# Patient Record
Sex: MALE | Race: ASIAN | NOT HISPANIC OR LATINO | URBAN - METROPOLITAN AREA
[De-identification: names, ages, dates, MRNs, and addresses within clinical notes are randomized per-mention and may not be internally consistent; named-entity substitution may affect disease eponyms.]

---

## 2024-03-18 ENCOUNTER — EMERGENCY (EMERGENCY)
Facility: HOSPITAL | Age: 59
LOS: 1 days | Discharge: HOME | End: 2024-03-18
Attending: STUDENT IN AN ORGANIZED HEALTH CARE EDUCATION/TRAINING PROGRAM | Admitting: EMERGENCY MEDICINE
Payer: COMMERCIAL

## 2024-03-18 VITALS
HEART RATE: 95 BPM | RESPIRATION RATE: 18 BRPM | DIASTOLIC BLOOD PRESSURE: 84 MMHG | SYSTOLIC BLOOD PRESSURE: 133 MMHG | OXYGEN SATURATION: 100 % | TEMPERATURE: 98 F

## 2024-03-18 PROCEDURE — 99285 EMERGENCY DEPT VISIT HI MDM: CPT

## 2024-03-18 RX ORDER — KETOROLAC TROMETHAMINE 30 MG/ML
15 SYRINGE (ML) INJECTION ONCE
Refills: 0 | Status: DISCONTINUED | OUTPATIENT
Start: 2024-03-18 | End: 2024-03-18

## 2024-03-18 RX ORDER — ACETAMINOPHEN 500 MG
1000 TABLET ORAL ONCE
Refills: 0 | Status: COMPLETED | OUTPATIENT
Start: 2024-03-18 | End: 2024-03-18

## 2024-03-18 RX ORDER — SODIUM CHLORIDE 9 MG/ML
1000 INJECTION, SOLUTION INTRAVENOUS ONCE
Refills: 0 | Status: COMPLETED | OUTPATIENT
Start: 2024-03-18 | End: 2024-03-18

## 2024-03-18 NOTE — ED ADULT TRIAGE NOTE - CHIEF COMPLAINT QUOTE
Patient c/o swollen eye lids, upper lips and lower mandibles x1 week but worst today. Patient admits to being diagnosed with gingivitis. Patient started amoxillin today. denies PHX. Denies chest pain sob. airway patent.

## 2024-03-19 VITALS
TEMPERATURE: 98 F | DIASTOLIC BLOOD PRESSURE: 67 MMHG | SYSTOLIC BLOOD PRESSURE: 112 MMHG | RESPIRATION RATE: 16 BRPM | HEART RATE: 74 BPM | OXYGEN SATURATION: 98 %

## 2024-03-19 LAB
ADD ON TEST-SPECIMEN IN LAB: SIGNIFICANT CHANGE UP
ALBUMIN SERPL ELPH-MCNC: 3.3 G/DL — SIGNIFICANT CHANGE UP (ref 3.3–5)
ALP SERPL-CCNC: 65 U/L — SIGNIFICANT CHANGE UP (ref 40–120)
ALT FLD-CCNC: 38 U/L — SIGNIFICANT CHANGE UP (ref 4–41)
ANION GAP SERPL CALC-SCNC: 10 MMOL/L — SIGNIFICANT CHANGE UP (ref 7–14)
ANION GAP SERPL CALC-SCNC: 10 MMOL/L — SIGNIFICANT CHANGE UP (ref 7–14)
AST SERPL-CCNC: 29 U/L — SIGNIFICANT CHANGE UP (ref 4–40)
BASOPHILS # BLD AUTO: 0.03 K/UL — SIGNIFICANT CHANGE UP (ref 0–0.2)
BASOPHILS # BLD AUTO: 0.04 K/UL — SIGNIFICANT CHANGE UP (ref 0–0.2)
BASOPHILS NFR BLD AUTO: 0.2 % — SIGNIFICANT CHANGE UP (ref 0–2)
BASOPHILS NFR BLD AUTO: 0.3 % — SIGNIFICANT CHANGE UP (ref 0–2)
BILIRUB SERPL-MCNC: 0.6 MG/DL — SIGNIFICANT CHANGE UP (ref 0.2–1.2)
BUN SERPL-MCNC: 8 MG/DL — SIGNIFICANT CHANGE UP (ref 7–23)
BUN SERPL-MCNC: 9 MG/DL — SIGNIFICANT CHANGE UP (ref 7–23)
CALCIUM SERPL-MCNC: 8 MG/DL — LOW (ref 8.4–10.5)
CALCIUM SERPL-MCNC: 8.1 MG/DL — LOW (ref 8.4–10.5)
CHLORIDE SERPL-SCNC: 104 MMOL/L — SIGNIFICANT CHANGE UP (ref 98–107)
CHLORIDE SERPL-SCNC: 99 MMOL/L — SIGNIFICANT CHANGE UP (ref 98–107)
CO2 SERPL-SCNC: 23 MMOL/L — SIGNIFICANT CHANGE UP (ref 22–31)
CO2 SERPL-SCNC: 24 MMOL/L — SIGNIFICANT CHANGE UP (ref 22–31)
CREAT SERPL-MCNC: 0.68 MG/DL — SIGNIFICANT CHANGE UP (ref 0.5–1.3)
CREAT SERPL-MCNC: 0.72 MG/DL — SIGNIFICANT CHANGE UP (ref 0.5–1.3)
EGFR: 107 ML/MIN/1.73M2 — SIGNIFICANT CHANGE UP
EGFR: 108 ML/MIN/1.73M2 — SIGNIFICANT CHANGE UP
EOSINOPHIL # BLD AUTO: 0 K/UL — SIGNIFICANT CHANGE UP (ref 0–0.5)
EOSINOPHIL # BLD AUTO: 0.01 K/UL — SIGNIFICANT CHANGE UP (ref 0–0.5)
EOSINOPHIL NFR BLD AUTO: 0 % — SIGNIFICANT CHANGE UP (ref 0–6)
EOSINOPHIL NFR BLD AUTO: 0.1 % — SIGNIFICANT CHANGE UP (ref 0–6)
GLUCOSE SERPL-MCNC: 118 MG/DL — HIGH (ref 70–99)
GLUCOSE SERPL-MCNC: 120 MG/DL — HIGH (ref 70–99)
HCT VFR BLD CALC: 34.3 % — LOW (ref 39–50)
HCT VFR BLD CALC: 37.3 % — LOW (ref 39–50)
HGB BLD-MCNC: 12.1 G/DL — LOW (ref 13–17)
HGB BLD-MCNC: 12.8 G/DL — LOW (ref 13–17)
IANC: 12.9 K/UL — HIGH (ref 1.8–7.4)
IANC: 13.04 K/UL — HIGH (ref 1.8–7.4)
IMM GRANULOCYTES NFR BLD AUTO: 0.4 % — SIGNIFICANT CHANGE UP (ref 0–0.9)
IMM GRANULOCYTES NFR BLD AUTO: 0.5 % — SIGNIFICANT CHANGE UP (ref 0–0.9)
LYMPHOCYTES # BLD AUTO: 0.92 K/UL — LOW (ref 1–3.3)
LYMPHOCYTES # BLD AUTO: 1.24 K/UL — SIGNIFICANT CHANGE UP (ref 1–3.3)
LYMPHOCYTES # BLD AUTO: 5.8 % — LOW (ref 13–44)
LYMPHOCYTES # BLD AUTO: 7.4 % — LOW (ref 13–44)
MCHC RBC-ENTMCNC: 32.1 PG — SIGNIFICANT CHANGE UP (ref 27–34)
MCHC RBC-ENTMCNC: 33.3 PG — SIGNIFICANT CHANGE UP (ref 27–34)
MCHC RBC-ENTMCNC: 34.3 GM/DL — SIGNIFICANT CHANGE UP (ref 32–36)
MCHC RBC-ENTMCNC: 35.3 GM/DL — SIGNIFICANT CHANGE UP (ref 32–36)
MCV RBC AUTO: 93.5 FL — SIGNIFICANT CHANGE UP (ref 80–100)
MCV RBC AUTO: 94.5 FL — SIGNIFICANT CHANGE UP (ref 80–100)
MONOCYTES # BLD AUTO: 1.87 K/UL — HIGH (ref 0–0.9)
MONOCYTES # BLD AUTO: 2.26 K/UL — HIGH (ref 0–0.9)
MONOCYTES NFR BLD AUTO: 11.8 % — SIGNIFICANT CHANGE UP (ref 2–14)
MONOCYTES NFR BLD AUTO: 13.6 % — SIGNIFICANT CHANGE UP (ref 2–14)
NEUTROPHILS # BLD AUTO: 12.9 K/UL — HIGH (ref 1.8–7.4)
NEUTROPHILS # BLD AUTO: 13.04 K/UL — HIGH (ref 1.8–7.4)
NEUTROPHILS NFR BLD AUTO: 78.3 % — HIGH (ref 43–77)
NEUTROPHILS NFR BLD AUTO: 81.6 % — HIGH (ref 43–77)
NRBC # BLD: 0 /100 WBCS — SIGNIFICANT CHANGE UP (ref 0–0)
NRBC # BLD: 0 /100 WBCS — SIGNIFICANT CHANGE UP (ref 0–0)
NRBC # FLD: 0 K/UL — SIGNIFICANT CHANGE UP (ref 0–0)
NRBC # FLD: 0 K/UL — SIGNIFICANT CHANGE UP (ref 0–0)
PLATELET # BLD AUTO: 198 K/UL — SIGNIFICANT CHANGE UP (ref 150–400)
PLATELET # BLD AUTO: 208 K/UL — SIGNIFICANT CHANGE UP (ref 150–400)
POTASSIUM SERPL-MCNC: 4 MMOL/L — SIGNIFICANT CHANGE UP (ref 3.5–5.3)
POTASSIUM SERPL-MCNC: 4.2 MMOL/L — SIGNIFICANT CHANGE UP (ref 3.5–5.3)
POTASSIUM SERPL-SCNC: 4 MMOL/L — SIGNIFICANT CHANGE UP (ref 3.5–5.3)
POTASSIUM SERPL-SCNC: 4.2 MMOL/L — SIGNIFICANT CHANGE UP (ref 3.5–5.3)
PROT SERPL-MCNC: 6.9 G/DL — SIGNIFICANT CHANGE UP (ref 6–8.3)
RBC # BLD: 3.63 M/UL — LOW (ref 4.2–5.8)
RBC # BLD: 3.99 M/UL — LOW (ref 4.2–5.8)
RBC # FLD: 11.8 % — SIGNIFICANT CHANGE UP (ref 10.3–14.5)
RBC # FLD: 11.9 % — SIGNIFICANT CHANGE UP (ref 10.3–14.5)
SODIUM SERPL-SCNC: 133 MMOL/L — LOW (ref 135–145)
SODIUM SERPL-SCNC: 137 MMOL/L — SIGNIFICANT CHANGE UP (ref 135–145)
WBC # BLD: 15.81 K/UL — HIGH (ref 3.8–10.5)
WBC # BLD: 16.65 K/UL — HIGH (ref 3.8–10.5)
WBC # FLD AUTO: 15.81 K/UL — HIGH (ref 3.8–10.5)
WBC # FLD AUTO: 16.65 K/UL — HIGH (ref 3.8–10.5)

## 2024-03-19 PROCEDURE — 70487 CT MAXILLOFACIAL W/DYE: CPT | Mod: 26,MC

## 2024-03-19 PROCEDURE — 99236 HOSP IP/OBS SAME DATE HI 85: CPT

## 2024-03-19 RX ORDER — AMPICILLIN SODIUM AND SULBACTAM SODIUM 250; 125 MG/ML; MG/ML
3 INJECTION, POWDER, FOR SUSPENSION INTRAMUSCULAR; INTRAVENOUS EVERY 6 HOURS
Refills: 0 | Status: DISCONTINUED | OUTPATIENT
Start: 2024-03-19 | End: 2024-03-22

## 2024-03-19 RX ORDER — AMPICILLIN SODIUM AND SULBACTAM SODIUM 250; 125 MG/ML; MG/ML
3 INJECTION, POWDER, FOR SUSPENSION INTRAMUSCULAR; INTRAVENOUS ONCE
Refills: 0 | Status: COMPLETED | OUTPATIENT
Start: 2024-03-19 | End: 2024-03-19

## 2024-03-19 RX ORDER — ACETAMINOPHEN 500 MG
975 TABLET ORAL EVERY 6 HOURS
Refills: 0 | Status: DISCONTINUED | OUTPATIENT
Start: 2024-03-19 | End: 2024-03-22

## 2024-03-19 RX ORDER — AMPICILLIN SODIUM AND SULBACTAM SODIUM 250; 125 MG/ML; MG/ML
INJECTION, POWDER, FOR SUSPENSION INTRAMUSCULAR; INTRAVENOUS
Refills: 0 | Status: DISCONTINUED | OUTPATIENT
Start: 2024-03-19 | End: 2024-03-22

## 2024-03-19 RX ORDER — KETOROLAC TROMETHAMINE 30 MG/ML
15 SYRINGE (ML) INJECTION EVERY 6 HOURS
Refills: 0 | Status: DISCONTINUED | OUTPATIENT
Start: 2024-03-19 | End: 2024-03-19

## 2024-03-19 RX ORDER — CHLORHEXIDINE GLUCONATE 213 G/1000ML
15 SOLUTION TOPICAL
Qty: 1 | Refills: 0
Start: 2024-03-19 | End: 2024-03-25

## 2024-03-19 RX ADMIN — AMPICILLIN SODIUM AND SULBACTAM SODIUM 200 GRAM(S): 250; 125 INJECTION, POWDER, FOR SUSPENSION INTRAMUSCULAR; INTRAVENOUS at 05:45

## 2024-03-19 RX ADMIN — Medication 15 MILLIGRAM(S): at 01:10

## 2024-03-19 RX ADMIN — Medication 100 MILLIGRAM(S): at 01:10

## 2024-03-19 RX ADMIN — Medication 400 MILLIGRAM(S): at 01:08

## 2024-03-19 RX ADMIN — AMPICILLIN SODIUM AND SULBACTAM SODIUM 200 GRAM(S): 250; 125 INJECTION, POWDER, FOR SUSPENSION INTRAMUSCULAR; INTRAVENOUS at 11:28

## 2024-03-19 RX ADMIN — SODIUM CHLORIDE 1000 MILLILITER(S): 9 INJECTION, SOLUTION INTRAVENOUS at 03:43

## 2024-03-19 NOTE — ED PROVIDER NOTE - OBJECTIVE STATEMENT
60 Y/O M (Cantonese speaking, son is translating) states that he has had swelling for the past week that has been increasing and has also had difficulty fully opening his jaw due to facial swelling. Pt denies fever/chills/nightsweats/difficulty speaking/difficulty swallowing or difficulty breathing. Pt states he was prescribed Amoxicillin by his PCP and took 3 doses without improvement. Pt 60 Y/O M (Cantonese speaking, son is translating) states that he has had swelling for the past week that has been increasing and has also had difficulty fully opening his jaw due to facial swelling. Pt denies fever/chills/nightsweats/difficulty speaking/difficulty swallowing or difficulty breathing. Pt states he was prescribed Amoxicillin by his PCP and took 3 doses without improvement. Pt denies any other sx or acute complaints.

## 2024-03-19 NOTE — ED ADULT NURSE REASSESSMENT NOTE - NSFALLUNIVINTERV_ED_ALL_ED
Bed/Stretcher in lowest position, wheels locked, appropriate side rails in place/Call bell, personal items and telephone in reach/Instruct patient to call for assistance before getting out of bed/chair/stretcher/Non-slip footwear applied when patient is off stretcher/[place] to call system/Physically safe environment - no spills, clutter or unnecessary equipment/Purposeful proactive rounding/Room/bathroom lighting operational, light cord in reach

## 2024-03-19 NOTE — CONSULT NOTE ADULT - ASSESSMENT
59 yr old male with no PMH presenting with 1x week facial swelling. CT scan showing left maxillary abscess associated with #9. indicated for I&D.     Plan:  - admit to CDU, 2x rounds of IV antibioitcs  - multimodal pain control     - Please follow up with [facility] on FRIDAY 3/22 for drain removal: Dr. 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 CT scan showing left maxillary abscess associated with #9. indicated for I&D.     Plan:  - bedside I&D  - admit to CDU, 2x rounds of IV antibioitcs  - multimodal pain control   - discharge on peridex, augmentin 875 BId x 7 days     - Please follow up with [facility] on FRIDAY 3/22 for drain removal: Dr. Luis Joseph  - Please call [phone] to confirm appt    [facility]   OMFS/ Dental clinic: 1st floor  [address] [place] [address]  [phone]    OMFS I&D:  : Parris [id number]  procedure: I&D of left maxillary vestibular abscess  - prior to the start of the procedure the patient was consented with the risks, benefits, and alternatives of the procedure. all of the patient's questions were answered to patient's satisfaction. the patient elects to move forward with procedure.     - 5 cc of 1% lidocaine with 1:100,000 epi for infiltration around left maxillary vestibule. needles changed between injections.     15 blade used to create vestibular incision to bone distal to #9 root apex. significant patricia purulence encountered. 5cc of purulence recovered. purulence sent for culture. blunt dissection along abscess plane. area irrigated with copious amounts of sterile saline. 1/4" inch penrose drain placed and secured with 2x silk suture. drain trimmed for patient comfort and patency confirmed patient tolerated procedure well without complication. hemostasis achieved.    post op instructions given to patient. informed pt that #9 will need to be addressed with either a RCT or extraction. informed pt that if he does not have #9 addressed, the infection will come back and can come back worse than initial presentation. patient understands and will see general dentist.     - pt to follow up with OMFS on Friday 3/22 for removal of drain     Bryan Stiles DDS  [facility] Pager: [phone]  [place] Pager: [phone]  [facility] Pager: [phone]  Avaliable on Microsoft Teams (PLEASE USE RESIDENT) 59 yr old male with no PMH presenting with 1x week facial swelling. CT scan showing left maxillary abscess associated with #9. indicated for I&D.     Plan:  - bedside I&D  - admit to CDU, 2x rounds of IV antibioitcs  - multimodal pain control   - ice as needed   - discharge on peridex, augmentin 875 BId x 7 days     - Please follow up with [facility] on FRIDAY 3/22 for drain removal: Dr. Luis Joseph  - Please call [phone] to confirm appt    [facility]   OMFS/ Dental clinic: 1st floor  [address] [place] [address]  [phone]    OMFS I&D:  : Parris [id number]  procedure: I&D of left maxillary vestibular abscess  - prior to the start of the procedure the patient was consented with the risks, benefits, and alternatives of the procedure. all of the patient's questions were answered to patient's satisfaction. the patient elects to move forward with procedure.     - 5 cc of 1% lidocaine with 1:100,000 epi for infiltration around left maxillary vestibule. needles changed between injections.     15 blade used to create vestibular incision to bone distal to #9 root apex. significant patricia purulence encountered. 5cc of purulence recovered. purulence sent for culture. blunt dissection along abscess plane. area irrigated with copious amounts of sterile saline. 1/4" inch penrose drain placed and secured with 2x silk suture. drain trimmed for patient comfort and patency confirmed patient tolerated procedure well without complication. hemostasis achieved.    post op instructions given to patient. informed pt that #9 will need to be addressed with either a RCT or extraction. informed pt that if he does not have #9 addressed, the infection will come back and can come back worse than initial presentation. patient understands and will see general dentist.     - pt to follow up with OMFS on Friday 3/22 for removal of drain     Bryan Stiles DDS  [facility] Pager: [phone]  [place] Pager: [phone]  [facility] Pager: [phone]  Avaliable on Microsoft Teams (PLEASE USE RESIDENT)

## 2024-03-19 NOTE — ED CDU PROVIDER INITIAL DAY NOTE - CLINICAL SUMMARY MEDICAL DECISION MAKING FREE TEXT BOX
58 Y/O M (Cantonese speaking, son is translating) states that he has had swelling for the past week that has been increasing and has also had difficulty fully opening his jaw due to facial swelling. Pt denies fever/chills/nightsweats/difficulty speaking/difficulty swallowing or difficulty breathing. Pt states he was prescribed Amoxicillin by his PCP and took 3 doses without improvement. On CT a 3.3 CM dental abscess was found and oral surgery was consulted. Plan is CDU placement for continued IV antibiotics, oral surgery evaluation.

## 2024-03-19 NOTE — ED ADULT NURSE NOTE - OBJECTIVE STATEMENT
Pt arrives to ED room 15, A&Ox4, ambulatory at baseline. pt C/O swelling x 1 week. Pt states he has been diagnosed with gingivitis. Pt endorses difficulty fully opening his jaw due to facial swelling. Pt denies fever/chills/nightsweats/difficulty speaking/difficulty swallowing or difficulty breathing. Pt states he was prescribed Amoxicillin by his PCP and took 3 doses without improvement. Airway intact, respirations are even and unlabored. Abdomen is soft and nondistended, capillary refill is 2 seconds bilaterally, skin is clean, dry, and intact. 20G IV placed in right AC by LISSETTE Cutler. Bed in lowest position, safety maintained. Pt arrives to ED room 15, A&Ox4, ambulatory at baseline. pt C/O swelling x 1 week. Pt states he has been diagnosed with gingivitis. Pt endorses difficulty fully opening his jaw due to facial swelling. Pt denies fever/chills/nightsweats/difficulty speaking/difficulty swallowing or difficulty breathing. Pt states he was prescribed Amoxicillin by his PCP and took 3 doses without improvement. Airway intact, respirations are even and unlabored. Facil swelling observed on left cheek. No drooling observed, pt able to speak in full sentences. Abdomen is soft and nondistended, capillary refill is 2 seconds bilaterally, skin is clean, dry, and intact. 20G IV placed in right AC by zhou Larson. Bed in lowest position, safety maintained.

## 2024-03-19 NOTE — ED CDU PROVIDER DISPOSITION NOTE - ATTENDING CONTRIBUTION TO CARE
59-year-old male with no past medical history presents to ED complaining of facial swelling.  CT found with 3.3 cm left sinus collection secondary to dental infection versus osteomyelitis.  OMFS consulted does not believe patient has osteomyelitis and likely dental abscess status post I&D and drain placement.  Patient sent to CDU for IV antibiotics Unasyn for 2 doses.  Patient clinically improved swelling still there.  Patient states he feels better and amenable for discharge home on Augmentin and Peridex per OMFS. Patient to follow-up March 22 for drain removal.

## 2024-03-19 NOTE — ED CDU PROVIDER DISPOSITION NOTE - PATIENT PORTAL LINK FT
You can access the FollowMyHealth Patient Portal offered by [facility] by registering at the following website: [email or web address]. By joining [email or web address] FollowMyHealth portal, you will also be able to view your health information using other applications (apps) compatible with our system.

## 2024-03-19 NOTE — ED ADULT NURSE REASSESSMENT NOTE - AS PAIN REST
8 (severe pain)
0 (no pain/absence of nonverbal indicators of pain)
0 (no pain/absence of nonverbal indicators of pain)

## 2024-03-19 NOTE — ED ADULT NURSE NOTE - NSFALLUNIVINTERV_ED_ALL_ED
Bed/Stretcher in lowest position, wheels locked, appropriate side rails in place/Call bell, personal items and telephone in reach/Instruct patient to call for assistance before getting out of bed/chair/stretcher/Non-slip footwear applied when patient is off stretcher/[place] to call system/Physically safe environment - no spills, clutter or unnecessary equipment/Purposeful proactive rounding/Room/bathroom lighting operational, light cord in reach

## 2024-03-19 NOTE — ED CDU PROVIDER DISPOSITION NOTE - NSFOLLOWUPINSTRUCTIONS_ED_ALL_ED_FT
Follow up with Oral-Maxillary Facial Surgery (OMFS) Friday 3/22 for drain removal. Dr. Luis Joseph  Call [phone].    OMFS/ Dental clinic: 1st floor  [address] [place] [address]  [phone]      Take Motrin 600mg every 8hrs with food for pain.  Take Augemtin as prescribed.  Use peridex daily.    Worsening, continued or new concerning symptoms return to the emergency department.

## 2024-03-19 NOTE — ED CDU PROVIDER INITIAL DAY NOTE - DISCUSSED CASE WITH MULTISELECT OPTIONS
Consultant Purse String (Simple) Text: Given the location of the defect and the characteristics of the surrounding skin a purse string simple closure was deemed most appropriate.  Undermining was performed circumferentially around the surgical defect.  A purse string suture was then placed and tightened.

## 2024-03-19 NOTE — ED CDU PROVIDER INITIAL DAY NOTE - ATTENDING APP SHARED VISIT CONTRIBUTION OF CARE
Matt Choi, DO:  patient seen and evaluated with the PA. I was present for key portions of the History & Physical, and I agree with the Impression & Plan. 60 yo m No reported PMH, PW facial swelling.  PW son bedside provides collateral translation, official  declined.  Reports that patient has had left jaw pain, upper mandible, upper molar for several days, worsening last few days.  Received amoxicillin, took 3 doses.  States swelling is getting worse, bilateral upper face.  Worse on left side.  Denies F/C,/V, sour taste in mouth.  Patient arrives HDS well-appearing, tolerating secretions, speaking full sentences.  Do note poor dentition and multiple dental caries on the left side.  No anterior cervical LAD.  Full ROM of neck.  found to have abscess, possible OM. eval by OMFS, drain placed. cdu for iv abx.

## 2024-03-19 NOTE — ED PROVIDER NOTE - PROGRESS NOTE DETAILS
GARY Malin: Paged ENT and OMFS, awaiting callback GARY Malin: Paged [facility], awaiting callback GARY Malin: Discussed with oral surgery, will consult.

## 2024-03-19 NOTE — ED CDU PROVIDER INITIAL DAY NOTE - OBJECTIVE STATEMENT
58 Y/O M (Cantonese speaking, son is translating) states that he has had swelling for the past week that has been increasing and has also had difficulty fully opening his jaw due to facial swelling. Pt denies fever/chills/nightsweats/difficulty speaking/difficulty swallowing or difficulty breathing. Pt states he was prescribed Amoxicillin by his PCP and took 3 doses without improvement. On CT a 3.3 CM dental abscess was found and oral surgery was consulted. Plan is CDU placement for continued IV antibiotics, pain control, reassessment.

## 2024-03-19 NOTE — CONSULT NOTE ADULT - SUBJECTIVE AND OBJECTIVE BOX
OMFS Consult Note:    TEJ BROWNING  MRN-[id number]  [facility] ED.    Patient is a 59y old  Male who presents with a chief complaint of facial swelling.       HPI:  58 Y/O M (Cantonese speaking, son is translating) states that he has had swelling for the past week that has been increasing and has also had difficulty fully opening his jaw due to facial swelling. Pt denies fever/chills/nightsweats/difficulty speaking/difficulty swallowing or difficulty breathing. Pt states he was prescribed Amoxicillin by his PCP and took 3 doses without improvement. Pt denies any other sx or acute complaints.    PAST MEDICAL & SURGICAL HISTORY:  No pertinent past medical history      No significant past surgical history            Home Medications:        Allergies    No Known Allergies    Intolerances            *SOCIAL HISTORY: Denies ETOH use, Tobacco, drugs    Review of systems:  denies fever, chills. denies LOC. denies nausea/vomiting/headache. denies CP, SOB, cough. Denies palpatitions. denies blurred/double vision. denies dyspahgia, dyspnea.      T(F): 98.5 (03-18-24 @ 22:36), Max: 98.5 (03-18-24 @ 22:36)  HR: 95 (03-18-24 @ 22:36) (95 - 95)  BP: 133/84 (03-18-24 @ 22:36) (133/84 - 133/84)  RR: 18 (03-18-24 @ 22:36) (18 - 18)  SpO2: 100% (03-18-24 @ 22:36) (100% - 100%)      MEDICATIONS  (STANDING):  clindamycin IVPB 900 milliGRAM(s) IV Intermittent every 8 hours    MEDICATIONS  (PRN):  acetaminophen     Tablet .. 975 milliGRAM(s) Oral every 6 hours PRN Mild Pain (1 - 3)  ketorolac   Injectable 15 milliGRAM(s) IV Push every 6 hours PRN Moderate Pain (4 - 6)      PHYSICAL EXAM:    Gen: AAox3, NAD, NC/AT  Head: no Lacerations/abrasions/hematomas. no edema/erythema/tenderness to palpation. no asymmetry. no signs of scalp infection  Eyes: EOMI, PERRL, visual acuity intact, no diplopia,  supra/infra orbital rims intact, no subconjunctival heme, no telecanthus, no exophthalmos  Ears: Gross hearing intact, No heme appreciated, Condylar head palpated  Nose: no crepitis/septal hematoma/asymmetry.  no Lacerations/abrasions/hematomas.  Malar: no malar depression, no CN V-2 paresthesia  Throat: no LAD, supple, FROM, no lesions    Extraoral/Intraoral Exam: AYANA: (   ) , Dentition grossly intact, no carious dentition,  occlusion stable and reproducible, no lacerations/abrasions/hematomas, no mandibular step deformity, no CN V-3 paresthesia, no signs of infection, no edema/erythema/tenderness to palpation. FOM soft, NT. no mobility of maxilla/crepitis. TMJ: (   ) clicking/popping  CN II-XII intact    LABS:                          12.8   16.65 )-----------( 208      ( 19 Mar 2024 01:15 )             37.3     03-19    133<L>  |  99  |  9   ----------------------------<  120<H>  4.0   |  24  |  0.72    Ca    8.1<L>      19 Mar 2024 01:15    TPro  6.9  /  Alb  3.3  /  TBili  0.6  /  DBili  x   /  AST  29  /  ALT  38  /  AlkPhos  65  03-19    Urinalysis Basic - ( 19 Mar 2024 01:15 )    Color: x / Appearance: x / SG: x / pH: x  Gluc: 120 mg/dL / Ketone: x  / Bili: x / Urobili: x   Blood: x / Protein: x / Nitrite: x   Leuk Esterase: x / RBC: x / WBC x   Sq Epi: x / Non Sq Epi: x / Bacteria: x            IMAGING:  < from: CT Maxillofacial w/ IV Cont (03.19.24 @ 00:56) >  ACC: [id number] EXAM:  CT MAXILLOFACIAL  IC   ORDERED BY: NORMA EASLEY     PROCEDURE DATE:  03/19/2024          INTERPRETATION:  CLINICAL INFORMATION: Upper lip pain and trismus,   question odontogenic infection..    TECHNIQUE:  Contrast enhanced maxillofacial CT was performed with thin section axial   images.  Sagittal and coronal reformations were created.  Intravenous contrast: 90 cc Omnipaque 350 administered, 10 cc discarded.    COMPARISON: None.    FINDINGS:    SOFT TISSUES: There is an ill-defined gas liquid collection anterior to   maxilla, measuring 3.3 x 1.1 x 1.8 cm with extension to the anterior   nasal septum.  MAXILLOFACIAL BONES: There is osseous erosion of the bilateral maxillary   spines.  TEMPOROMANDIBULAR JOINTS: Intact.  TYMPANOMASTOID CAVITIES: Within normal limits.  PARANASAL SINUSES: Pansinus mucosal thickening.  DENTITION: Streak artifact from dental hardware limits complete   evaluation. Periapical lucency surrounds the left maxillary central   incisor. Additionally there is periapical lucency surrounding the right   second mandibular molar tooth.  ORBITS: Within normal limits. No radiopaque orbital foreign body.  VESSELS: Within normal limits.    IMAGED PORTIONS:  BRAIN: Within normal limits.  CERVICAL SPINE:Within normal limits.    IMPRESSION:  A 3.3 cm collection anterior to the maxilla extending to the anterior   nasal septum, compatible with abscess, likely secondary to left maxillary   central incisor periapical lucency compatible with dental disease. Bony   erosion of the maxillary findings likely reflecting osteomyelitis.    < end of copied text >       OMFS Consult Note:    TEJ BROWNING  MRN-[id number]  [facility] ED.    Patient is a 59y old  Male who presents with a chief complaint of facial swelling.       HPI:  58 Y/O M (Cantonese speaking, son is translating) states that he has had swelling for the past week that has been increasing and has also had difficulty fully opening his jaw due to facial swelling. Pt denies fever/chills/nightsweats/difficulty speaking/difficulty swallowing or difficulty breathing. Pt states he was prescribed Amoxicillin by his PCP and took 3 doses without improvement. Pt denies any other sx or acute complaints.    PAST MEDICAL & SURGICAL HISTORY:  No pertinent past medical history      No significant past surgical history      Allergies    No Known Allergies    Intolerances            *SOCIAL HISTORY: Denies ETOH use, Tobacco, drugs    Review of systems:  denies fever, chills. denies LOC. denies nausea/vomiting/headache. denies CP, SOB, cough. Denies palpatitions. denies blurred/double vision. denies dyspahgia, dyspnea.      T(F): 98.5 (03-18-24 @ 22:36), Max: 98.5 (03-18-24 @ 22:36)  HR: 95 (03-18-24 @ 22:36) (95 - 95)  BP: 133/84 (03-18-24 @ 22:36) (133/84 - 133/84)  RR: 18 (03-18-24 @ 22:36) (18 - 18)  SpO2: 100% (03-18-24 @ 22:36) (100% - 100%)      MEDICATIONS  (STANDING):  clindamycin IVPB 900 milliGRAM(s) IV Intermittent every 8 hours    MEDICATIONS  (PRN):  acetaminophen     Tablet .. 975 milliGRAM(s) Oral every 6 hours PRN Mild Pain (1 - 3)  ketorolac   Injectable 15 milliGRAM(s) IV Push every 6 hours PRN Moderate Pain (4 - 6)      PHYSICAL EXAM:    Gen: AAox3, NAD, NC/AT  Head: significant anterior maxillary swelling. swelling is firm. tenderness to palpation. no drainage appreciated    Eyes: EOMI, PERRL, visual acuity intact, no diplopia,  supra/infra orbital rims intact, no subconjunctival heme, no telecanthus, no exophthalmos  Ears: Gross hearing intact, No heme appreciated, Condylar head palpated  Nose: no crepitis/septal hematoma/asymmetry.  no Lacerations/abrasions/hematomas.  Malar: no malar depression, no CN V-2 paresthesia  Throat: no LAD, supple, FROM, no lesions    Extraoral/Intraoral Exam: AYANA: 40 , Dentition grossly intact, no carious dentition,  occlusion stable and reproducible, no lacerations/abrasions/hematomas, no mandibular step deformity, no CN V-3 paresthesia. FOM soft. left anterior maxillary vestibular fullness correlated to abscess collection on scan. swelling is firm and tender to palpation. no active drainage appreciated.     TMJ: no clicking/popping  CN II-XII intact    LABS:                          12.8   16.65 )-----------( 208      ( 19 Mar 2024 01:15 )             37.3     03-19    133<L>  |  99  |  9   ----------------------------<  120<H>  4.0   |  24  |  0.72    Ca    8.1<L>      19 Mar 2024 01:15    TPro  6.9  /  Alb  3.3  /  TBili  0.6  /  DBili  x   /  AST  29  /  ALT  38  /  AlkPhos  65  03-19    Urinalysis Basic - ( 19 Mar 2024 01:15 )    Color: x / Appearance: x / SG: x / pH: x  Gluc: 120 mg/dL / Ketone: x  / Bili: x / Urobili: x   Blood: x / Protein: x / Nitrite: x   Leuk Esterase: x / RBC: x / WBC x   Sq Epi: x / Non Sq Epi: x / Bacteria: x            IMAGING:  < from: CT Maxillofacial w/ IV Cont (03.19.24 @ 00:56) >  ACC: [id number] EXAM:  CT MAXILLOFACIAL  IC   ORDERED BY: NORMA EASLEY     PROCEDURE DATE:  03/19/2024          INTERPRETATION:  CLINICAL INFORMATION: Upper lip pain and trismus,   question odontogenic infection..    TECHNIQUE:  Contrast enhanced maxillofacial CT was performed with thin section axial   images.  Sagittal and coronal reformations were created.  Intravenous contrast: 90 cc Omnipaque 350 administered, 10 cc discarded.    COMPARISON: None.    FINDINGS:    SOFT TISSUES: There is an ill-defined gas liquid collection anterior to   maxilla, measuring 3.3 x 1.1 x 1.8 cm with extension to the anterior   nasal septum.  MAXILLOFACIAL BONES: There is osseous erosion of the bilateral maxillary   spines.  TEMPOROMANDIBULAR JOINTS: Intact.  TYMPANOMASTOID CAVITIES: Within normal limits.  PARANASAL SINUSES: Pansinus mucosal thickening.  DENTITION: Streak artifact from dental hardware limits complete   evaluation. Periapical lucency surrounds the left maxillary central   incisor. Additionally there is periapical lucency surrounding the right   second mandibular molar tooth.  ORBITS: Within normal limits. No radiopaque orbital foreign body.  VESSELS: Within normal limits.    IMAGED PORTIONS:  BRAIN: Within normal limits.  CERVICAL SPINE:Within normal limits.    IMPRESSION:  A 3.3 cm collection anterior to the maxilla extending to the anterior   nasal septum, compatible with abscess, likely secondary to left maxillary   central incisor periapical lucency compatible with dental disease. Bony   erosion of the maxillary findings likely reflecting osteomyelitis.    < end of copied text >       OMFS Consult Note:    TEJ BROWNING  MRN-[id number]  [facility] ED.    : Parris [id number]    Patient is a 59y old  Male who presents with a chief complaint of facial swelling.       HPI:  58 Y/O M (Cantonese speaking, son is translating) states that he has had swelling for the past week that has been increasing and has also had difficulty fully opening his jaw due to facial swelling. Pt denies fever/chills/nightsweats/difficulty speaking/difficulty swallowing or difficulty breathing. Pt states he was prescribed Amoxicillin by his PCP and took 3 doses without improvement. Pt denies any other sx or acute complaints.    PAST MEDICAL & SURGICAL HISTORY:  No pertinent past medical history      No significant past surgical history      Allergies    No Known Allergies    Intolerances            *SOCIAL HISTORY: Denies ETOH use, Tobacco, drugs    Review of systems:  denies fever, chills. denies LOC. denies nausea/vomiting/headache. denies CP, SOB, cough. Denies palpatitions. denies blurred/double vision. denies dyspahgia, dyspnea.      T(F): 98.5 (03-18-24 @ 22:36), Max: 98.5 (03-18-24 @ 22:36)  HR: 95 (03-18-24 @ 22:36) (95 - 95)  BP: 133/84 (03-18-24 @ 22:36) (133/84 - 133/84)  RR: 18 (03-18-24 @ 22:36) (18 - 18)  SpO2: 100% (03-18-24 @ 22:36) (100% - 100%)      MEDICATIONS  (STANDING):  clindamycin IVPB 900 milliGRAM(s) IV Intermittent every 8 hours    MEDICATIONS  (PRN):  acetaminophen     Tablet .. 975 milliGRAM(s) Oral every 6 hours PRN Mild Pain (1 - 3)  ketorolac   Injectable 15 milliGRAM(s) IV Push every 6 hours PRN Moderate Pain (4 - 6)      PHYSICAL EXAM:    Gen: AAox3, NAD, NC/AT  Head: significant anterior maxillary swelling. swelling is firm. tenderness to palpation. no drainage appreciated    Eyes: EOMI, PERRL, visual acuity intact, no diplopia,  supra/infra orbital rims intact, no subconjunctival heme, no telecanthus, no exophthalmos  Ears: Gross hearing intact, No heme appreciated, Condylar head palpated  Nose: no crepitis/septal hematoma/asymmetry.  no Lacerations/abrasions/hematomas.  Malar: no malar depression, no CN V-2 paresthesia  Throat: no LAD, supple, FROM, no lesions    Extraoral/Intraoral Exam: AYANA: 40 , Dentition grossly intact, no carious dentition,  occlusion stable and reproducible, no lacerations/abrasions/hematomas, no mandibular step deformity, no CN V-3 paresthesia. FOM soft. left anterior maxillary vestibular fullness correlated to abscess collection on scan. swelling is firm and tender to palpation. no active drainage appreciated.     TMJ: no clicking/popping  CN II-XII intact    LABS:                          12.8   16.65 )-----------( 208      ( 19 Mar 2024 01:15 )             37.3     03-19    133<L>  |  99  |  9   ----------------------------<  120<H>  4.0   |  24  |  0.72    Ca    8.1<L>      19 Mar 2024 01:15    TPro  6.9  /  Alb  3.3  /  TBili  0.6  /  DBili  x   /  AST  29  /  ALT  38  /  AlkPhos  65  03-19    Urinalysis Basic - ( 19 Mar 2024 01:15 )    Color: x / Appearance: x / SG: x / pH: x  Gluc: 120 mg/dL / Ketone: x  / Bili: x / Urobili: x   Blood: x / Protein: x / Nitrite: x   Leuk Esterase: x / RBC: x / WBC x   Sq Epi: x / Non Sq Epi: x / Bacteria: x            IMAGING:  < from: CT Maxillofacial w/ IV Cont (03.19.24 @ 00:56) >  ACC: [id number] EXAM:  CT MAXILLOFACIAL  IC   ORDERED BY: NORMA EASLEY     PROCEDURE DATE:  03/19/2024          INTERPRETATION:  CLINICAL INFORMATION: Upper lip pain and trismus,   question odontogenic infection..    TECHNIQUE:  Contrast enhanced maxillofacial CT was performed with thin section axial   images.  Sagittal and coronal reformations were created.  Intravenous contrast: 90 cc Omnipaque 350 administered, 10 cc discarded.    COMPARISON: None.    FINDINGS:    SOFT TISSUES: There is an ill-defined gas liquid collection anterior to   maxilla, measuring 3.3 x 1.1 x 1.8 cm with extension to the anterior   nasal septum.  MAXILLOFACIAL BONES: There is osseous erosion of the bilateral maxillary   spines.  TEMPOROMANDIBULAR JOINTS: Intact.  TYMPANOMASTOID CAVITIES: Within normal limits.  PARANASAL SINUSES: Pansinus mucosal thickening.  DENTITION: Streak artifact from dental hardware limits complete   evaluation. Periapical lucency surrounds the left maxillary central   incisor. Additionally there is periapical lucency surrounding the right   second mandibular molar tooth.  ORBITS: Within normal limits. No radiopaque orbital foreign body.  VESSELS: Within normal limits.    IMAGED PORTIONS:  BRAIN: Within normal limits.  CERVICAL SPINE:Within normal limits.    IMPRESSION:  A 3.3 cm collection anterior to the maxilla extending to the anterior   nasal septum, compatible with abscess, likely secondary to left maxillary   central incisor periapical lucency compatible with dental disease. Bony   erosion of the maxillary findings likely reflecting osteomyelitis.    < end of copied text >

## 2024-03-19 NOTE — ED ADULT NURSE REASSESSMENT NOTE - NS ED NURSE REASSESS COMMENT FT1
Break RN Note- Patient resting quietly in bed, breathing even and nonlabored. No acute distress. Patient's face appears swollen, denies any difficulty breathing. Patient reports his gums hurt. Patient medicated as ordered. Labs sent. Safety maintained. Patient stable upon exiting the room.
Pt lying comfortably in stretcher. Pt is A&Ox4, not in acute distress, denies pain at this time. Respirations are even and unlabored. IV is patent and intact. Medicated as per MD order. Bed in lowest position, comfort measures provided, safety maintained.
Break RN: report received from LISSETTE Alvarado. Pt a&ox4, ambulatory. Airway patent; pt speaking in full sentences. Pt denies chest pain, sob, facial pain, difficulty swallowing, headache at this time. Breathing even, unlabored. Vitals stable. Safety maintained.
 used at [id number] #[id number] Misty Rucker. Pt received in no acute distress, A&Ox4, speaking in full sentences, breathing unlabored, pt able to ambulate to bathroom, denies any complaints at this time. Pt only asking when he will be sent home with medication. Vital updated, and pt provided with breakfast meal. Will continue to monitor.

## 2024-03-21 PROBLEM — Z78.9 OTHER SPECIFIED HEALTH STATUS: Chronic | Status: ACTIVE | Noted: 2024-03-19

## 2024-03-21 LAB
CULTURE RESULTS: ABNORMAL
SPECIMEN SOURCE: SIGNIFICANT CHANGE UP

## 2024-03-22 ENCOUNTER — APPOINTMENT (OUTPATIENT)
Age: 59
End: 2024-03-22
Payer: COMMERCIAL

## 2024-03-22 PROCEDURE — 99024 POSTOP FOLLOW-UP VISIT: CPT
